# Patient Record
Sex: FEMALE | Race: ASIAN | NOT HISPANIC OR LATINO
[De-identification: names, ages, dates, MRNs, and addresses within clinical notes are randomized per-mention and may not be internally consistent; named-entity substitution may affect disease eponyms.]

---

## 2017-09-08 ENCOUNTER — APPOINTMENT (OUTPATIENT)
Dept: CARDIOLOGY | Facility: CLINIC | Age: 52
End: 2017-09-08
Payer: MEDICAID

## 2017-09-08 ENCOUNTER — NON-APPOINTMENT (OUTPATIENT)
Age: 52
End: 2017-09-08

## 2017-09-08 VITALS
WEIGHT: 140 LBS | TEMPERATURE: 99 F | SYSTOLIC BLOOD PRESSURE: 108 MMHG | DIASTOLIC BLOOD PRESSURE: 69 MMHG | RESPIRATION RATE: 17 BRPM | BODY MASS INDEX: 26.45 KG/M2 | OXYGEN SATURATION: 99 % | HEART RATE: 67 BPM

## 2017-09-08 PROCEDURE — 99214 OFFICE O/P EST MOD 30 MIN: CPT

## 2017-09-08 PROCEDURE — 93351 STRESS TTE COMPLETE: CPT

## 2017-09-08 PROCEDURE — 93000 ELECTROCARDIOGRAM COMPLETE: CPT | Mod: 59

## 2019-11-27 ENCOUNTER — APPOINTMENT (OUTPATIENT)
Dept: CARDIOLOGY | Facility: CLINIC | Age: 54
End: 2019-11-27
Payer: MEDICAID

## 2019-11-27 VITALS
WEIGHT: 130 LBS | HEART RATE: 69 BPM | RESPIRATION RATE: 18 BRPM | TEMPERATURE: 97.5 F | OXYGEN SATURATION: 97 % | SYSTOLIC BLOOD PRESSURE: 105 MMHG | BODY MASS INDEX: 24.56 KG/M2 | DIASTOLIC BLOOD PRESSURE: 67 MMHG

## 2019-11-27 DIAGNOSIS — R53.1 WEAKNESS: ICD-10-CM

## 2019-11-27 DIAGNOSIS — R94.31 ABNORMAL ELECTROCARDIOGRAM [ECG] [EKG]: ICD-10-CM

## 2019-11-27 DIAGNOSIS — R07.89 OTHER CHEST PAIN: ICD-10-CM

## 2019-11-27 PROCEDURE — 93015 CV STRESS TEST SUPVJ I&R: CPT

## 2019-11-27 PROCEDURE — 93306 TTE W/DOPPLER COMPLETE: CPT

## 2019-11-27 PROCEDURE — 99214 OFFICE O/P EST MOD 30 MIN: CPT | Mod: 25

## 2019-11-29 NOTE — HISTORY OF PRESENT ILLNESS
[FreeTextEntry1] : Patient reports feeling weak after eating similar to 2 years ago. Patient reports feeing tired and needing to nap. Patient denies CP, SOB, palpitations, or lightheadedness. Her ECG is abnormal. I advised patient to undergo an echocardiogram and a treadmill stress test. \par \par 52-year-old female with no significant PMH presents for followup.  Patient was last seen on 9/8/17.\par \par (1) SOB, weakness following meals - Patient underwent a stress echo and completed 9.5 minutes of Luis protocol.  There was no ECG or echocardiographic evidence of ischemia.  Patient was reassured.  I advised patient to limit carbohydrate intake at meals to minimize rebound hypoglycemia..\par \par (2) Followup - as needed.\par \par 9/19/16 for evaluation of abnormal ECG.  Patient underwent a stress echo and completed 9 minutes of Luis protocol.  There was no ECG or echocardiographic evidence of ischemia.   Her ECG abnormality was felt to represent a normal variant.  Patient reports that for the past 2 years she has been experiencing weakness following meals, especially with breakfast.  Patient denies CP.  Patient reports occasional SOB.  Patient reports palpitations with exertion.  Patient denies lightheadedness.  \par

## 2019-11-29 NOTE — PHYSICAL EXAM
[General Appearance - Well Developed] : well developed [Normal Appearance] : normal appearance [Well Groomed] : well groomed [General Appearance - Well Nourished] : well nourished [No Deformities] : no deformities [General Appearance - In No Acute Distress] : no acute distress [Normal Conjunctiva] : the conjunctiva exhibited no abnormalities [Eyelids - No Xanthelasma] : the eyelids demonstrated no xanthelasmas [Normal Oral Mucosa] : normal oral mucosa [No Oral Pallor] : no oral pallor [No Oral Cyanosis] : no oral cyanosis [Normal Jugular Venous A Waves Present] : normal jugular venous A waves present [Normal Jugular Venous V Waves Present] : normal jugular venous V waves present [No Jugular Venous Cortez A Waves] : no jugular venous cortez A waves [Respiration, Rhythm And Depth] : normal respiratory rhythm and effort [Exaggerated Use Of Accessory Muscles For Inspiration] : no accessory muscle use [Auscultation Breath Sounds / Voice Sounds] : lungs were clear to auscultation bilaterally [Heart Rate And Rhythm] : heart rate and rhythm were normal [Heart Sounds] : normal S1 and S2 [Arterial Pulses Normal] : the arterial pulses were normal [Edema] : no peripheral edema present [Abdomen Soft] : soft [Abdomen Tenderness] : non-tender [Abdomen Mass (___ Cm)] : no abdominal mass palpated [Abnormal Walk] : normal gait [Gait - Sufficient For Exercise Testing] : the gait was sufficient for exercise testing [Nail Clubbing] : no clubbing of the fingernails [Cyanosis, Localized] : no localized cyanosis [Petechial Hemorrhages (___cm)] : no petechial hemorrhages [] : no ischemic changes [Oriented To Time, Place, And Person] : oriented to person, place, and time [Affect] : the affect was normal [Mood] : the mood was normal [No Anxiety] : not feeling anxious [FreeTextEntry1] : [facility]

## 2019-11-29 NOTE — DISCUSSION/SUMMARY
[FreeTextEntry1] : 52-year-old female with no significant PMH presents for followup.  Patient was last seen on 9/19/16 for evaluation of abnormal ECG.  Patient underwent a stress echo and completed 9 minutes of Luis protocol.  There was no ECG or echocardiographic evidence of ischemia.   Her ECG abnormality was felt to represent a normal variant.  Patient reports that for the past 2 years she has been experiencing weakness following meals, especially with breakfast.  Patient denies CP.  Patient reports occasional SOB.  Patient reports palpitations with exertion.  Patient denies lightheadedness.  \par \par (1) SOB, weakness following meals - Patient underwent a stress echo and completed 9.5 minutes of Luis protocol.  There was no ECG or echocardiographic evidence of ischemia.  Patient was reassured.  I advised patient to limit carbohydrate intake at meals to minimize rebound hypoglycemia..\par \par (2) Followup - as needed.